# Patient Record
Sex: MALE | Race: WHITE | NOT HISPANIC OR LATINO | Employment: FULL TIME | URBAN - METROPOLITAN AREA
[De-identification: names, ages, dates, MRNs, and addresses within clinical notes are randomized per-mention and may not be internally consistent; named-entity substitution may affect disease eponyms.]

---

## 2022-09-09 ENCOUNTER — OFFICE VISIT (OUTPATIENT)
Dept: URGENT CARE | Facility: PHYSICIAN GROUP | Age: 42
End: 2022-09-09
Payer: COMMERCIAL

## 2022-09-09 ENCOUNTER — HOSPITAL ENCOUNTER (OUTPATIENT)
Dept: RADIOLOGY | Facility: MEDICAL CENTER | Age: 42
End: 2022-09-09
Attending: FAMILY MEDICINE
Payer: COMMERCIAL

## 2022-09-09 VITALS
BODY MASS INDEX: 36.18 KG/M2 | WEIGHT: 273 LBS | TEMPERATURE: 98.5 F | RESPIRATION RATE: 16 BRPM | SYSTOLIC BLOOD PRESSURE: 132 MMHG | DIASTOLIC BLOOD PRESSURE: 86 MMHG | HEIGHT: 73 IN

## 2022-09-09 DIAGNOSIS — R60.0 EDEMA OF LEFT LOWER EXTREMITY: ICD-10-CM

## 2022-09-09 PROCEDURE — 99203 OFFICE O/P NEW LOW 30 MIN: CPT | Performed by: FAMILY MEDICINE

## 2022-09-09 PROCEDURE — 93971 EXTREMITY STUDY: CPT | Mod: LT

## 2022-09-09 RX ORDER — ESCITALOPRAM OXALATE 10 MG/1
20 TABLET ORAL DAILY
COMMUNITY

## 2022-09-09 RX ORDER — LAMOTRIGINE 200 MG/1
200 TABLET ORAL EVERY MORNING
COMMUNITY
Start: 2022-08-04

## 2022-09-09 RX ORDER — DEXTROAMPHETAMINE SACCHARATE, AMPHETAMINE ASPARTATE, DEXTROAMPHETAMINE SULFATE AND AMPHETAMINE SULFATE 5; 5; 5; 5 MG/1; MG/1; MG/1; MG/1
20 TABLET ORAL 2 TIMES DAILY
COMMUNITY

## 2022-09-09 ASSESSMENT — ENCOUNTER SYMPTOMS: FEVER: 0

## 2022-09-09 NOTE — PROGRESS NOTES
"Subjective:     Cullen Rossi is a 42 y.o. male who presents for Leg Swelling (Leg started swelling yesterday, emts state that could be a dvt. )    HPI  Pt presents for evaluation of an acute problem  Patient with leg swelling and is concerned about possible DVT  Has been wearing boots and socks which are too tight recently  Pain has been present for a few days and not improving  Has constant swelling  No history of DVT  No significant bruising present    Review of Systems   Constitutional:  Negative for fever.   Cardiovascular:  Positive for leg swelling.   Skin:  Positive for rash.     PMH:  has no past medical history on file.  MEDS:   Current Outpatient Medications:     lamotrigine (LAMICTAL) 200 MG tablet, Take 200 mg by mouth every morning., Disp: , Rfl:     amphetamine-dextroamphetamine (ADDERALL) 20 MG Tab, Take 20 mg by mouth 2 times a day., Disp: , Rfl:     escitalopram (LEXAPRO) 10 MG Tab, Take 20 mg by mouth every day., Disp: , Rfl:   ALLERGIES: Not on File  SURGHX: History reviewed. No pertinent surgical history.  SOCHX:       Objective:   /86   Temp 36.9 °C (98.5 °F)   Resp 16   Ht 1.854 m (6' 1\")   Wt 124 kg (273 lb)   BMI 36.02 kg/m²     Physical Exam  Constitutional:       General: He is not in acute distress.     Appearance: He is well-developed. He is not diaphoretic.   Pulmonary:      Effort: Pulmonary effort is normal.   Neurological:      Mental Status: He is alert.   Left lower extremity with 1+ pitting edema three quarters shin with slight erythema throughout anterior lower shin, mild tenderness in the lower shin and lower calf with no upper calf tenderness    Assessment/Plan:   Assessment    1. Edema of left lower extremity  - US-EXTREMITY VENOUS LOWER UNILAT LEFT; Future    Other orders  - lamotrigine (LAMICTAL) 200 MG tablet; Take 200 mg by mouth every morning.  - amphetamine-dextroamphetamine (ADDERALL) 20 MG Tab; Take 20 mg by mouth 2 times a day.  - escitalopram (LEXAPRO) 10 " MG Tab; Take 20 mg by mouth every day.    Patient with edema of left lower extremity.  Appears to be more skin irritation, however there is some concern for possible DVT.  Did recommend ultrasound to rule out DVT and patient agreeable.  Will have ultrasound later today.